# Patient Record
Sex: MALE | Race: BLACK OR AFRICAN AMERICAN | NOT HISPANIC OR LATINO | Employment: FULL TIME | URBAN - METROPOLITAN AREA
[De-identification: names, ages, dates, MRNs, and addresses within clinical notes are randomized per-mention and may not be internally consistent; named-entity substitution may affect disease eponyms.]

---

## 2017-04-26 ENCOUNTER — HOSPITAL ENCOUNTER (EMERGENCY)
Facility: HOSPITAL | Age: 52
Discharge: LEFT BEFORE DISCHARGE | End: 2017-04-26
Attending: EMERGENCY MEDICINE
Payer: MEDICARE

## 2017-04-26 VITALS
OXYGEN SATURATION: 97 % | HEART RATE: 91 BPM | TEMPERATURE: 98 F | SYSTOLIC BLOOD PRESSURE: 137 MMHG | BODY MASS INDEX: 33.09 KG/M2 | HEIGHT: 63 IN | RESPIRATION RATE: 24 BRPM | WEIGHT: 186.75 LBS | DIASTOLIC BLOOD PRESSURE: 93 MMHG

## 2017-04-26 DIAGNOSIS — R07.9 CHEST PAIN: ICD-10-CM

## 2017-04-26 DIAGNOSIS — I10 ESSENTIAL HYPERTENSION: Primary | ICD-10-CM

## 2017-04-26 LAB
ALBUMIN SERPL BCP-MCNC: 4.1 G/DL
ALP SERPL-CCNC: 58 U/L
ALT SERPL W/O P-5'-P-CCNC: 25 U/L
ANION GAP SERPL CALC-SCNC: 15 MMOL/L
AST SERPL-CCNC: 25 U/L
BASOPHILS # BLD AUTO: 0.04 K/UL
BASOPHILS NFR BLD: 1.1 %
BILIRUB SERPL-MCNC: 1.1 MG/DL
BNP SERPL-MCNC: 11 PG/ML
BUN SERPL-MCNC: 14 MG/DL
CALCIUM SERPL-MCNC: 9.8 MG/DL
CHLORIDE SERPL-SCNC: 104 MMOL/L
CO2 SERPL-SCNC: 21 MMOL/L
CREAT SERPL-MCNC: 0.9 MG/DL
D DIMER PPP IA.FEU-MCNC: 1.2 MG/L FEU
DIFFERENTIAL METHOD: ABNORMAL
EOSINOPHIL # BLD AUTO: 0.1 K/UL
EOSINOPHIL NFR BLD: 3.1 %
ERYTHROCYTE [DISTWIDTH] IN BLOOD BY AUTOMATED COUNT: 13.4 %
EST. GFR  (AFRICAN AMERICAN): >60 ML/MIN/1.73 M^2
EST. GFR  (NON AFRICAN AMERICAN): >60 ML/MIN/1.73 M^2
GLUCOSE SERPL-MCNC: 90 MG/DL
HCT VFR BLD AUTO: 44.8 %
HGB BLD-MCNC: 15.8 G/DL
INR PPP: 1
LYMPHOCYTES # BLD AUTO: 1.6 K/UL
LYMPHOCYTES NFR BLD: 45.2 %
MCH RBC QN AUTO: 30.8 PG
MCHC RBC AUTO-ENTMCNC: 35.3 %
MCV RBC AUTO: 87 FL
MONOCYTES # BLD AUTO: 0.3 K/UL
MONOCYTES NFR BLD: 8 %
NEUTROPHILS # BLD AUTO: 1.5 K/UL
NEUTROPHILS NFR BLD: 42.3 %
PLATELET # BLD AUTO: 241 K/UL
PMV BLD AUTO: 9 FL
POTASSIUM SERPL-SCNC: 4.7 MMOL/L
PROT SERPL-MCNC: 8.1 G/DL
PROTHROMBIN TIME: 10.6 SEC
RBC # BLD AUTO: 5.13 M/UL
RETIRED EF AND QEF - SEE NOTES: 55 (ref 55–65)
SODIUM SERPL-SCNC: 140 MMOL/L
TROPONIN I SERPL DL<=0.01 NG/ML-MCNC: <0.006 NG/ML
TROPONIN I SERPL DL<=0.01 NG/ML-MCNC: <0.006 NG/ML
WBC # BLD AUTO: 3.52 K/UL

## 2017-04-26 PROCEDURE — 93351 STRESS TTE COMPLETE: CPT

## 2017-04-26 PROCEDURE — 99285 EMERGENCY DEPT VISIT HI MDM: CPT

## 2017-04-26 PROCEDURE — 85025 COMPLETE CBC W/AUTO DIFF WBC: CPT

## 2017-04-26 PROCEDURE — 25500020 PHARM REV CODE 255: Performed by: EMERGENCY MEDICINE

## 2017-04-26 PROCEDURE — 93005 ELECTROCARDIOGRAM TRACING: CPT

## 2017-04-26 PROCEDURE — 80053 COMPREHEN METABOLIC PANEL: CPT

## 2017-04-26 PROCEDURE — 85610 PROTHROMBIN TIME: CPT

## 2017-04-26 PROCEDURE — 84484 ASSAY OF TROPONIN QUANT: CPT

## 2017-04-26 PROCEDURE — 83880 ASSAY OF NATRIURETIC PEPTIDE: CPT

## 2017-04-26 PROCEDURE — 85379 FIBRIN DEGRADATION QUANT: CPT

## 2017-04-26 PROCEDURE — 25000003 PHARM REV CODE 250: Performed by: EMERGENCY MEDICINE

## 2017-04-26 PROCEDURE — 93351 STRESS TTE COMPLETE: CPT | Mod: 26,,, | Performed by: INTERNAL MEDICINE

## 2017-04-26 PROCEDURE — 93010 ELECTROCARDIOGRAM REPORT: CPT | Mod: ,,, | Performed by: INTERNAL MEDICINE

## 2017-04-26 PROCEDURE — 93321 DOPPLER ECHO F-UP/LMTD STD: CPT | Mod: 26,,, | Performed by: INTERNAL MEDICINE

## 2017-04-26 RX ORDER — ASPIRIN 325 MG
325 TABLET ORAL DAILY
COMMUNITY
End: 2023-08-02

## 2017-04-26 RX ORDER — ACETAMINOPHEN 500 MG
1000 TABLET ORAL
Status: COMPLETED | OUTPATIENT
Start: 2017-04-26 | End: 2017-04-26

## 2017-04-26 RX ORDER — ASPIRIN 325 MG
325 TABLET ORAL
Status: COMPLETED | OUTPATIENT
Start: 2017-04-26 | End: 2017-04-26

## 2017-04-26 RX ADMIN — NITROGLYCERIN 1 INCH: 20 OINTMENT TOPICAL at 11:04

## 2017-04-26 RX ADMIN — ACETAMINOPHEN 1000 MG: 500 TABLET ORAL at 11:04

## 2017-04-26 RX ADMIN — ASPIRIN 325 MG ORAL TABLET 325 MG: 325 PILL ORAL at 10:04

## 2017-04-26 RX ADMIN — IOHEXOL 100 ML: 350 INJECTION, SOLUTION INTRAVENOUS at 01:04

## 2017-04-26 NOTE — ED NOTES
52 year old male presents to ed with chief complaint of left sided chest pain with radiation to bilateral back that began yesterday. Patient states sob with exertion. Patient awake alert ox4 answering nurses questions appropriately patient placed on heart monitor side rails up call light at bedside nurse will continue to monitor.

## 2017-04-26 NOTE — ED PROVIDER NOTES
"Encounter Date: 4/26/2017       History     Chief Complaint   Patient presents with    Chest Pain     intermittent sharp midsternal chest pains and SOB since yesterday. Reports SOB on exertion for the past few months. History of open heart surgery     Review of patient's allergies indicates:  No Known Allergies  HPI Comments: 51 Y/O AAM WITH PMHX OF TOBACCO ABUSE AND REMOTE PSHX OF CARDIAC SURGERY "TO PATCH A HOLE IN MY HEART" PRESENTS WITH C/O CP AND SOB THAT BEGAN YESTERDAY.  PT REPORTS ACUTE ONSET OF INTERMITTENT, NON-RADIATING, THROBBING, LEFT ANTERIOR CHEST PAIN THAT BEGAN YESTERDAY MORNING.  PAINFUL EPISODES 20 MINUTES DURATION.  PAIN RESOLVES W/O INTERVENTION.  PT BECAME MORE CONCERNED ABOUT HIS CP TODAY BECAUSE HIS SOB WAS WORSENING.  + DIAPHORESIS, + PALPITATIONS "FEELS LIKE MY HEART IS RACING."  PT REPORTS FEELING LIKE THERE IS A "KNOT IN MY BELLY" POINTING TO THE LUQ.  PT IS PAIN FREE AT THIS TIME.  PAIN IS WORSENED WITH EXERTION.  NO RELIEVING FACTORS NOTED.  PT REPORTS HE HAS NOT BEEN TO SEE A DOCTOR IN YEARS AND DOES NOT KNOW IF HE HAS HTN, DM, HLD OR CAD.      The history is provided by the patient. No  was used.     Past Medical History:   Diagnosis Date    Enlarged heart      Past Surgical History:   Procedure Laterality Date    BACK SURGERY      CARDIAC SURGERY      heart      SHOULDER SURGERY       No family history on file.  Social History   Substance Use Topics    Smoking status: Never Smoker    Smokeless tobacco: Never Used    Alcohol use No     Review of Systems   Constitutional: Negative for activity change, appetite change, chills, diaphoresis and fever.   HENT: Negative for congestion and sinus pressure.    Eyes: Negative for pain and redness.   Respiratory: Positive for shortness of breath. Negative for cough, chest tightness and wheezing.    Cardiovascular: Positive for chest pain and palpitations. Negative for leg swelling.   Gastrointestinal: Positive for " nausea. Negative for abdominal distention, abdominal pain, diarrhea, rectal pain and vomiting.   Endocrine: Negative for polydipsia and polyphagia.   Genitourinary: Negative for difficulty urinating and dysuria.   Musculoskeletal: Negative for back pain and neck pain.   Skin: Negative for pallor and rash.   Allergic/Immunologic: Negative for immunocompromised state.   Neurological: Negative for dizziness and headaches.   Hematological: Does not bruise/bleed easily.   Psychiatric/Behavioral: Negative for agitation and confusion.   All other systems reviewed and are negative.      Physical Exam   Initial Vitals   BP Pulse Resp Temp SpO2   04/26/17 1011 04/26/17 1011 04/26/17 1011 04/26/17 1011 04/26/17 1011   143/95 86 24 97.8 °F (36.6 °C) 96 %     Physical Exam    Nursing note and vitals reviewed.  Constitutional: He appears well-developed and well-nourished. He is not diaphoretic. No distress.   HENT:   Head: Normocephalic and atraumatic.   Mouth/Throat: Oropharynx is clear and moist.   Eyes: Conjunctivae and EOM are normal. No scleral icterus.   Neck: Normal range of motion. Neck supple.   Cardiovascular: Normal rate, regular rhythm and normal heart sounds. Exam reveals no gallop and no friction rub.    No murmur heard.  Pulmonary/Chest: Breath sounds normal. No respiratory distress. He has no wheezes. He has no rhonchi. He has no rales. He exhibits tenderness. He exhibits no mass, no crepitus and no edema.       Abdominal: Soft. Bowel sounds are normal. He exhibits no distension. There is no tenderness.   Musculoskeletal: Normal range of motion. He exhibits no edema or tenderness.   Lymphadenopathy:     He has no cervical adenopathy.   Neurological: He is alert and oriented to person, place, and time.   Skin: Skin is warm and dry. No rash noted. No erythema.   Psychiatric: He has a normal mood and affect. His behavior is normal. Judgment and thought content normal.         ED Course   Procedures  Labs Reviewed  "  CBC W/ AUTO DIFFERENTIAL   COMPREHENSIVE METABOLIC PANEL   PROTIME-INR   TROPONIN I   TROPONIN I   B-TYPE NATRIURETIC PEPTIDE   D DIMER, QUANTITATIVE     EKG Readings: (Independently Interpreted)   Initial Reading: No STEMI. Previous EKG: Compared with most recent EKG Previous EKG Date: 04/30/12. Heart Rate: 84.       X-Rays:   Independently Interpreted Readings:   Chest X-Ray: Normal heart size.  No infiltrates.  No acute abnormalities.     Medical Decision Making:   Initial Assessment:   53 Y/O WITH INTERMITTENT CP AND SOB X 2 DAYS  Differential Diagnosis:   DDX: PULMONARY EMBOLUS, PNEUMONIA, STEMI, ARRHYTHMIA, AAA  Independently Interpreted Test(s):   I have ordered and independently interpreted X-rays - see prior notes.  I have ordered and independently interpreted EKG Reading(s) - see prior notes  Clinical Tests:   Lab Tests: Ordered and Reviewed  The following lab test(s) were unremarkable: CBC, CMP, Troponin and BNP       <> Summary of Lab: ELEVATED D-DIMER  Radiological Study: Ordered and Reviewed  Medical Tests: Ordered and Reviewed  ED Management:  NITRO PASTE TO CHEST WALL.  D-DIMER IS ELEVATED SO CTA LUNG WAS DONE AND IS NEG FOR PE.  I HAVE CONSULTED DR. CAO AND HE WILL READ PT STRESS DONE IN ED.  PT IS CP FREE.    1437:  B/P 137/93, HR 88 BPM, SAT 98%    PT HAS HAD A NEG CTA IN ED AND TWO NEG TROP.  PT HAD A STRESS ECHO DONE TODAY BUT REFUSES TO WAIT FOR RESULTS OF TEST.  PT IS REQUESTING TO "SIGN SOMETHING AND LEAVE."  PT IS AWARE THAT HE MAY DIE OF SUDDEN CARDIAC DEATH OR SUFFER DISABILITY, HEART ATTACK OR STROKE BY LEAVING AMA.  I HAVE ASKED HIM TO WAIT FOR HIS RESULTS AND HE REFUSES.   PT ENCOURAGED TO RETURN IF SYMPTOMS WORSEN.   Other:   I have discussed this case with another health care provider.       <> Summary of the Discussion: DR. CAO                   ED Course     Clinical Impression:   The primary encounter diagnosis was Essential hypertension. A diagnosis of Chest pain was " also pertinent to this visit.    Disposition:   Disposition: AMA  Condition: Stable       Ladonna Mackey MD  04/26/17 7105

## 2017-04-26 NOTE — PROGRESS NOTES
Attempted to flush pt's IV with saline prior to contrast injection and noticed line had infiltrated.  Started new line in LAC (18g) and completed CT using new line.

## 2017-04-26 NOTE — ED NOTES
Patient requesting to leave ama. Patient stating he is ready to go see his family. Dr. Mackey at bedside

## 2017-04-27 ENCOUNTER — NURSE TRIAGE (OUTPATIENT)
Dept: ADMINISTRATIVE | Facility: CLINIC | Age: 52
End: 2017-04-27

## 2017-04-27 NOTE — TELEPHONE ENCOUNTER
Reason for Disposition   Requesting regular office appointment    Protocols used: ST INFORMATION ONLY CALL-A-AH    Karsten is calling to report he was seen in ER yesterday for chest pain.   had a work up but left AMA prior to results.   has no chest pain or any symptoms today but wants to schedule an appointment with a cardiologist who can go over results with him.  Transferred to scheduling for appointment

## 2017-05-11 ENCOUNTER — TELEPHONE (OUTPATIENT)
Dept: CARDIOLOGY | Facility: CLINIC | Age: 52
End: 2017-05-11

## 2017-05-11 NOTE — TELEPHONE ENCOUNTER
I attempted to reach patient for schedule change for tomorrow.His appointment had to be rescheduled due to Provider being needed at hospital tomorrow.I left message for patient we can place in appointment with another provider next week to contact the office at [phone] and ask for Natacha or Rosa Isela.

## 2017-05-11 NOTE — TELEPHONE ENCOUNTER
The patient called back and was explained why his appointment was rescheduled for next week.He stated he went to the ER for CP but because he needed to go home and be with his children he needed to sign AMA and did not receive an evaluation.I cautioned patient to go back to the ER if CP would become more frequent or not relieved.He verbalized understanding and will see Cardiology next week .

## 2017-05-18 ENCOUNTER — OFFICE VISIT (OUTPATIENT)
Dept: CARDIOLOGY | Facility: CLINIC | Age: 52
End: 2017-05-18
Payer: MEDICARE

## 2017-05-18 VITALS
DIASTOLIC BLOOD PRESSURE: 94 MMHG | SYSTOLIC BLOOD PRESSURE: 137 MMHG | HEART RATE: 85 BPM | BODY MASS INDEX: 32.99 KG/M2 | WEIGHT: 186.19 LBS | HEIGHT: 63 IN

## 2017-05-18 DIAGNOSIS — I10 ESSENTIAL HYPERTENSION: Chronic | ICD-10-CM

## 2017-05-18 DIAGNOSIS — R00.0 RACING HEART BEAT: ICD-10-CM

## 2017-05-18 DIAGNOSIS — I73.9 CLAUDICATION: ICD-10-CM

## 2017-05-18 DIAGNOSIS — R06.09 DOE (DYSPNEA ON EXERTION): Primary | ICD-10-CM

## 2017-05-18 DIAGNOSIS — R07.89 CHEST TIGHTNESS: ICD-10-CM

## 2017-05-18 DIAGNOSIS — R42 LIGHTHEADEDNESS: ICD-10-CM

## 2017-05-18 PROCEDURE — 99999 PR PBB SHADOW E&M-EST. PATIENT-LVL III: CPT | Mod: PBBFAC,,, | Performed by: NURSE PRACTITIONER

## 2017-05-18 PROCEDURE — 99203 OFFICE O/P NEW LOW 30 MIN: CPT | Mod: S$PBB,,, | Performed by: NURSE PRACTITIONER

## 2017-05-18 PROCEDURE — 99213 OFFICE O/P EST LOW 20 MIN: CPT | Mod: PBBFAC,PO | Performed by: NURSE PRACTITIONER

## 2017-05-18 RX ORDER — HYDROCHLOROTHIAZIDE 25 MG/1
25 TABLET ORAL DAILY
Qty: 30 TABLET | Refills: 6 | Status: SHIPPED | OUTPATIENT
Start: 2017-05-18 | End: 2023-08-02

## 2017-05-18 NOTE — MR AVS SNAPSHOT
[facility]  [address] [facility]  [place] [address]  Phone: [phone]  Fax: [phone]                  Karsten Zuniga   2017 8:20 AM   Office Visit    Description:  Male : 1965   Provider:  Mary Greer NP   Department:  [facility]           Reason for Visit     Hospital Follow Up     Consult           Diagnoses this Visit        Comments    CARLOS (dyspnea on exertion)    -  Primary     Chest tightness         Lightheadedness         Racing heart beat         Stenosis of carotid artery, unspecified laterality         Claudication         Acute cerebrovascular insufficiency                To Do List           Future Appointments        Provider Department Dept Phone    2017 1:30 PM HOLTER [facility] [phone]    2017 2:30 PM COMFORT ECHO SCHEDULE [facility] [phone]    2017 3:15 PM [facility]FORT ECHO SCHEDULE [facility] [phone]    2017 11:00 AM Mary Greer NP [facility] [phone]      Goals (5 Years of Data)     None       These Medications        Disp Refills Start End    hydrochlorothiazide (HYDRODIURIL) 25 MG tablet 30 tablet 6 2017     Take 1 tablet (25 mg total) by mouth once daily. - Oral    Pharmacy: [facility] [address] [place] [address] [address] AT [facility] [address] Ph #: [phone]         [facility]     [facility] On Call Nurse Care Line - 24/ Assistance  Unless otherwise directed by your provider, please contact [facility], our nurse care line that is available for 24/7 assistance.     Registered nurses in the [facility] Center provide: appointment scheduling, clinical advisement, health education, and other advisory services.  Call: [phone] (toll free)               Medications           Message regarding Medications     Verify the changes and/or additions to your medication regime listed below are the same as discussed with your clinician  "today.  If any of these changes or additions are incorrect, please notify your healthcare provider.        START taking these NEW medications        Refills    hydrochlorothiazide (HYDRODIURIL) 25 MG tablet 6    Sig: Take 1 tablet (25 mg total) by mouth once daily.    Class: Normal    Route: Oral           Verify that the below list of medications is an accurate representation of the medications you are currently taking.  If none reported, the list may be blank. If incorrect, please contact your healthcare provider. Carry this list with you in case of emergency.           Current Medications     aspirin 325 MG tablet Take 325 mg by mouth once daily.    hydrochlorothiazide (HYDRODIURIL) 25 MG tablet Take 1 tablet (25 mg total) by mouth once daily.           Clinical Reference Information           Your Vitals Were     BP Pulse Height Weight BMI    137/94 (BP Location: Left arm, Patient Position: Sitting, BP Method: Automatic) 85 5' 3" (1.6 m) 84.5 kg (186 lb 2.9 oz) 32.98 kg/m2      Blood Pressure          Most Recent Value    BP  (!)  137/94      Allergies as of 5/18/2017     No Known Allergies      Immunizations Administered on Date of Encounter - 5/18/2017     None      Orders Placed During Today's Visit      Normal Orders This Visit    Ambulatory referral to Pulmonology     Future Labs/Procedures Expected by Expires    CAR Ultrasound doppler arterial legs bilat  As directed 5/18/2018    CAR Ultrasound doppler carotid bliateral  As directed 5/18/2018    Holter Monitor - 3-14 Day Adult (zio patch)  As directed 5/18/2018      [facility] Sign-Up     Activating your [facility] account is as easy as 1-2-3!     1) Visit [email or web address], select Sign Up Now, enter this activation code and your date of birth, then select Next.  [email or web address]  Expires: 7/2/2017  9:03 AM      2) Create a username and password to use when you visit [facility] in the future and select a security question in case you lose your password and " select Next.    3) Enter your e-mail address and click Sign Up!    Additional Information  If you have questions, please e-mail [email or web address] or call [phone] to talk to our [facility] staff. Remember, [facility] is NOT to be used for urgent needs. For medical emergencies, dial 911.         Language Assistance Services     ATTENTION: Language assistance services are available, free of charge. Please call [phone].      ATENCIÓN: Si habla rubia, tiene a pope disposición servicios gratuitos de asistencia lingüística. Llame al [phone].     CHÚ Ý: N?u b?n nói Ti?ng Vi?t, có các d?ch v? h? tr? ngôn ng? mi?n phí dành cho b?n. G?i s? [phone].         [facility] complies with applicable Federal civil rights laws and does not discriminate on the basis of race, color, national origin, age, disability, or sex.

## 2017-05-18 NOTE — PROGRESS NOTES
Subjective:    Patient ID:  Karsten Zuniga is a 52 y.o. male who presents for evaluation of Hospital Follow Up and Consult      HPI: Mr. Karsten Zuniga presents to the clinic for evaluation of chest pain. he stated that he has left side of chest that radiates to the back. It occurs with exertion such as walking. It tends to start out with lightheadedness while walking and then chest discomfort begins. It is always associated with SOB. He stated that with rest, all symptoms resolve. He has had these symptoms for quite some time and no one can figure out what is causing them. He denied N/V/diaphoresis. He also says that it feels like his heart is not beating right. It may be racing, but he really can't describe it.  He stated that in the 90s, he was evaluated by a cardiologist in [place] and had heart surgery to close a hole in his heart. He had these same symptoms back then, and they did not improve after surgery. He denied any coronary stents or MI in the past.  Went to [facility] on 4/26/17 for these same symptoms. Troponin negative X 2 . He had an exercise stress echo. He stated that he had chest tightness, SOB and lightheadedness. He wanted to get off the treadmill but he tried to reach his target heart rate. He achieved 83% of his Max. Pred. Heart rate. His symptoms resolved in early recovery. He also had CT of chest-No PE. Chest x-ray negative. He left hospital AMA before he received the results. /95 pulse 86 in ER.    He stated that he does snore loudly, but he has never been told that he stops breathing. Never had a sleep study.    He stated that he is disabled, and doesn't do much at home. He takes care of his horses in the morning and doesn't do much after that.     EX2D 4/26/17: CONCLUSIONS     1 - Normal left ventricular systolic function (EF 55-60%).     2 - No wall motion abnormalities.   No evidence of stress induced myocardial ischemia. Sensitivity reduce as stress images were obtained at  sub optimal HR-83% Max pred. Heart rate. He had moderate chest pain during protocol that resolved spontaneously during early recovery.    Medications: he is taking only ASA. He stated that he used to be on heart medications about 15 years ago, but stopped them when he started using drugs. He does not recall what the medications were or why he was taking them.  Sodium: he does not add salt to foods at the table; his wife does cook with a little salt,  he is not reading labels for sodium content.   Exercise: he does not; walks around, but it causes lightheadedness and later chest tightness occurs with SOB  Tobacco:Former smoker; quit 8 years ago; smoked 1.5 ppd for 30 years. drinks 2-3 beers (cans) at a time for 4-5 days per week.     Weight: 84.5 kg (186 lb 2.9 oz) he states that his weight has increased over the last year.  Wt Readings from Last 3 Encounters:   05/18/17 84.5 kg (186 lb 2.9 oz)   04/26/17 84.7 kg (186 lb 11.7 oz)   05/13/16 72.6 kg (160 lb)     BP log: None; doesn't have a monitoring device.      Review of Systems   Constitution: Negative for chills, decreased appetite, diaphoresis, fever, night sweats, weight gain and weight loss.   HENT: Negative for congestion.    Cardiovascular: Positive for chest pain, claudication (He complains of pain with walking that improves when he stops. He uses a self-propelled , and he stated that he holds on to the bar and sometimes, he has to drag his feet becasue his legs hurt.), dyspnea on exertion and leg swelling. Negative for cyanosis, irregular heartbeat, near-syncope, orthopnea, palpitations, paroxysmal nocturnal dyspnea and syncope.   Respiratory: Positive for snoring. Negative for cough, hemoptysis, shortness of breath, sputum production and wheezing.    Hematologic/Lymphatic: Negative for adenopathy and bleeding problem. Does not bruise/bleed easily.   Skin: Negative for color change and nail changes.   Gastrointestinal: Negative for bloating,  abdominal pain, change in bowel habit, heartburn, hematochezia, melena, nausea and vomiting.   Genitourinary: Negative for hematuria.   Neurological: Positive for light-headedness. Negative for dizziness.   Psychiatric/Behavioral: Negative for altered mental status.       Objective:   Physical Exam   Constitutional: He is oriented to person, place, and time. He appears well-developed and well-nourished. No distress.   HENT:   Head: Normocephalic and atraumatic.   Eyes: Conjunctivae are normal. No scleral icterus.   Neck: Neck supple. No JVD present. No tracheal deviation present. No thyromegaly present.   Cardiovascular: Normal rate, regular rhythm, normal heart sounds and intact distal pulses.  Exam reveals no gallop and no friction rub.    No murmur heard.  Pulmonary/Chest: Effort normal and breath sounds normal. No respiratory distress. He has no wheezes. He has no rales. He exhibits no tenderness.   Abdominal: Soft. Bowel sounds are normal. He exhibits no distension and no mass. There is no tenderness. There is no rebound and no guarding.   Musculoskeletal: Normal range of motion. He exhibits no edema.   Lymphadenopathy:     He has no cervical adenopathy.   Neurological: He is alert and oriented to person, place, and time.   Skin: Skin is warm and dry. No rash noted. He is not diaphoretic. No erythema. No pallor.   Pink   Psychiatric: He has a normal mood and affect.   Results for ANSHU CRESPO (MRN [id number]) as of 5/18/2017 08:19   Ref. Range 4/26/2017 10:28 4/26/2017 13:54   Sodium Latest Ref Range: 136 - 145 mmol/L 140    Potassium Latest Ref Range: 3.5 - 5.1 mmol/L 4.7    Chloride Latest Ref Range: 95 - 110 mmol/L 104    CO2 Latest Ref Range: 23 - 29 mmol/L 21 (L)    Anion Gap Latest Ref Range: 8 - 16 mmol/L 15    BUN, Bld Latest Ref Range: 6 - 20 mg/dL 14    Creatinine Latest Ref Range: 0.5 - 1.4 mg/dL 0.9    eGFR if non African American Latest Ref Range: >60 mL/min/1.73 m^2 >60    eGFR if African American  Latest Ref Range: >60 mL/min/1.73 m^2 >60    Glucose Latest Ref Range: 70 - 110 mg/dL 90    Calcium Latest Ref Range: 8.7 - 10.5 mg/dL 9.8    Alkaline Phosphatase Latest Ref Range: 55 - 135 U/L 58    Total Protein Latest Ref Range: 6.0 - 8.4 g/dL 8.1    Albumin Latest Ref Range: 3.5 - 5.2 g/dL 4.1    Total Bilirubin Latest Ref Range: 0.1 - 1.0 mg/dL 1.1 (H)    AST Latest Ref Range: 10 - 40 U/L 25    ALT Latest Ref Range: 10 - 44 U/L 25    Troponin I Latest Ref Range: 0.000 - 0.026 ng/mL <0.006 <0.006   BNP Latest Ref Range: 0 - 99 pg/mL 11    Results for ANSHU CRESPO (MRN [id number]) as of 5/18/2017 08:19   Ref. Range 4/26/2017 10:28   WBC Latest Ref Range: 3.90 - 12.70 K/uL 3.52 (L)   RBC Latest Ref Range: 4.60 - 6.20 M/uL 5.13   Hemoglobin Latest Ref Range: 14.0 - 18.0 g/dL 15.8   Hematocrit Latest Ref Range: 40.0 - 54.0 % 44.8   MCV Latest Ref Range: 82 - 98 fL 87   MCH Latest Ref Range: 27.0 - 31.0 pg 30.8   MCHC Latest Ref Range: 32.0 - 36.0 % 35.3   RDW Latest Ref Range: 11.5 - 14.5 % 13.4   Platelets Latest Ref Range: 150 - 350 K/uL 241   MPV Latest Ref Range: 9.2 - 12.9 fL 9.0 (L)   Gran% Latest Ref Range: 38.0 - 73.0 % 42.3   Gran # Latest Ref Range: 1.8 - 7.7 K/uL 1.5 (L)   Lymph% Latest Ref Range: 18.0 - 48.0 % 45.2   Lymph # Latest Ref Range: 1.0 - 4.8 K/uL 1.6   Mono% Latest Ref Range: 4.0 - 15.0 % 8.0   Mono # Latest Ref Range: 0.3 - 1.0 K/uL 0.3   Eosinophil% Latest Ref Range: 0.0 - 8.0 % 3.1   Eos # Latest Ref Range: 0.0 - 0.5 K/uL 0.1   Basophil% Latest Ref Range: 0.0 - 1.9 % 1.1   Baso # Latest Ref Range: 0.00 - 0.20 K/uL 0.04   Protime Latest Ref Range: 9.0 - 12.5 sec 10.6   Coumadin Monitoring INR Latest Ref Range: 0.8 - 1.2  1.0   D-Dimer Latest Ref Range: <0.50 mg/L FEU 1.20 (H)        Assessment:      1. CARLOS (dyspnea on exertion)    2. Chest tightness    3. Lightheadedness    4. Racing heart beat    5. Claudication    6. Essential hypertension        Plan:   Reviewed EKG from 4/26/17: NSR;  widespread T wave inversion-likely LVH with repolarization abnormality.    Carotid ultrasound  Arterial ultrasound of the legs with ALEM.  Zio patch monitor.  Start HCTZ.  Sodium restriction; handout given to share with his wife.  Referral to pulmonary for evaluation of CARLOS and possible KUMAR. He prefers to stay in [place] and go to [facility].  Get cardiology records from [facility]-possible angiogram and he had cardiac surgery there.  Follow up after testing complete.

## 2017-05-24 ENCOUNTER — TELEPHONE (OUTPATIENT)
Dept: CARDIOLOGY | Facility: CLINIC | Age: 52
End: 2017-05-24

## 2017-05-24 NOTE — TELEPHONE ENCOUNTER
Patient informed that zio patch will be applied on 6/1 after 1:00pm when the zio rep comes in to be present for appt. Verbalized understanding

## 2017-05-25 ENCOUNTER — CLINICAL SUPPORT (OUTPATIENT)
Dept: CARDIOLOGY | Facility: CLINIC | Age: 52
End: 2017-05-25
Payer: MEDICARE

## 2017-05-25 DIAGNOSIS — R42 LIGHTHEADEDNESS: ICD-10-CM

## 2017-05-25 DIAGNOSIS — I73.9 CLAUDICATION: ICD-10-CM

## 2017-05-25 DIAGNOSIS — R06.09 DOE (DYSPNEA ON EXERTION): ICD-10-CM

## 2017-05-25 DIAGNOSIS — R07.89 CHEST TIGHTNESS: ICD-10-CM

## 2017-05-25 LAB — INTERNAL CAROTID STENOSIS: NORMAL

## 2017-05-25 PROCEDURE — 93925 LOWER EXTREMITY STUDY: CPT | Mod: 26,S$PBB,, | Performed by: INTERNAL MEDICINE

## 2017-05-25 PROCEDURE — 93880 EXTRACRANIAL BILAT STUDY: CPT | Mod: PBBFAC,PO | Performed by: INTERNAL MEDICINE

## 2017-06-02 ENCOUNTER — LAB VISIT (OUTPATIENT)
Dept: LAB | Facility: HOSPITAL | Age: 52
End: 2017-06-02
Attending: NURSE PRACTITIONER
Payer: MEDICARE

## 2017-06-02 ENCOUNTER — OFFICE VISIT (OUTPATIENT)
Dept: CARDIOLOGY | Facility: CLINIC | Age: 52
End: 2017-06-02
Payer: MEDICARE

## 2017-06-02 VITALS
SYSTOLIC BLOOD PRESSURE: 132 MMHG | HEIGHT: 63 IN | HEART RATE: 81 BPM | WEIGHT: 185.5 LBS | DIASTOLIC BLOOD PRESSURE: 86 MMHG | BODY MASS INDEX: 32.87 KG/M2

## 2017-06-02 DIAGNOSIS — R42 DIZZINESS: ICD-10-CM

## 2017-06-02 DIAGNOSIS — R53.83 FATIGUE, UNSPECIFIED TYPE: ICD-10-CM

## 2017-06-02 DIAGNOSIS — I10 HTN, GOAL BELOW 140/90: ICD-10-CM

## 2017-06-02 DIAGNOSIS — R06.83 SNORING: Chronic | ICD-10-CM

## 2017-06-02 DIAGNOSIS — R06.09 DOE (DYSPNEA ON EXERTION): Chronic | ICD-10-CM

## 2017-06-02 DIAGNOSIS — Z71.2 ENCOUNTER TO DISCUSS TEST RESULTS: Primary | ICD-10-CM

## 2017-06-02 LAB
CHOLEST/HDLC SERPL: 3.9 {RATIO}
HDL/CHOLESTEROL RATIO: 25.4 %
HDLC SERPL-MCNC: 236 MG/DL
HDLC SERPL-MCNC: 60 MG/DL
LDLC SERPL CALC-MCNC: 164.6 MG/DL
NONHDLC SERPL-MCNC: 176 MG/DL
TRIGL SERPL-MCNC: 57 MG/DL
TSH SERPL DL<=0.005 MIU/L-ACNC: 1.28 UIU/ML

## 2017-06-02 PROCEDURE — 99213 OFFICE O/P EST LOW 20 MIN: CPT | Mod: S$PBB,,, | Performed by: NURSE PRACTITIONER

## 2017-06-02 PROCEDURE — 84443 ASSAY THYROID STIM HORMONE: CPT

## 2017-06-02 PROCEDURE — 36415 COLL VENOUS BLD VENIPUNCTURE: CPT | Mod: PO

## 2017-06-02 PROCEDURE — 80061 LIPID PANEL: CPT

## 2017-06-02 PROCEDURE — 99999 PR PBB SHADOW E&M-EST. PATIENT-LVL IV: CPT | Mod: PBBFAC,,, | Performed by: NURSE PRACTITIONER

## 2017-06-02 NOTE — PROGRESS NOTES
Subjective:    Patient ID:  Karsten Zuniga is a 52 y.o. male who presents for follow-up of Dizziness (recently had echo)      HPI: . Karsten Zuniga presents to the clinic for follow up of test results. he stated that he had an episode of dizziness this morning, with some SOB and a knot in his left chest anteriorly at the diphram area. He stated that it lasted about one hour. He said that these episodes are recurrent and very scary. The dizziness feels like he is drunk-the room is spinning. And it also feels like his heart is not beating right. He denied dehydration or missing meals. He stated that he was not able to come for appointment for Zio patch. It has been rescheduled.  He related that his wife stated that he snores loudly; he is sleepy during the day.    Medications: he is not missing any doses of HCTZ, and he is not having any problems with it.  Sodium: he does add salt to foods only when cooking, but they reduced the amount.  he is not reading labels for sodium content.   Exercise: he does not; sedentary  Tobacco: Former smoker. Beer, one can 2-3 times a month.    Arterial U/S of legs 5/25/17: normal chayo bilateral   the waveforms are normal at all levels with normal triphasic waveforms w/o any significant stenosis.    Carotid U/S 5/25/17: CONCLUSIONS   There is 20 - 39% right Internal Carotid stenosis.  There is 20 - 39% left Internal Carotid stenosis.    EX2D 4/2017: CONCLUSIONS     1 - Normal left ventricular systolic function (EF 55-60%).     2 - No wall motion abnormalities.   No evidence of stress induced myocardial ischemia. Sensitivity reduce as stress images were obtained at sub optimal HR (83%).     Weight: 84.1 kg (185 lb 8 oz) he states that his daily weights has been stable  Wt Readings from Last 3 Encounters:   06/02/17 84.1 kg (185 lb 8 oz)   05/18/17 84.5 kg (186 lb 2.9 oz)   04/26/17 84.7 kg (186 lb 11.7 oz)     BP log: None.    Review of Systems   Constitution: Positive for malaise/fatigue.  Negative for chills, decreased appetite, fever, night sweats, weight gain and weight loss.   HENT: Negative for congestion, ear discharge, ear pain, headaches, hoarse voice and tinnitus.    Cardiovascular: Positive for dyspnea on exertion (walking short distances). Negative for claudication, cyanosis, irregular heartbeat, leg swelling, near-syncope, orthopnea, paroxysmal nocturnal dyspnea and syncope.   Respiratory: Positive for shortness of breath. Negative for cough, hemoptysis, sputum production and wheezing.    Hematologic/Lymphatic: Negative for adenopathy and bleeding problem. Does not bruise/bleed easily.   Skin: Negative for color change and nail changes.   Gastrointestinal: Negative for bloating, abdominal pain, change in bowel habit, heartburn, hematochezia, melena, nausea and vomiting.   Genitourinary: Negative for hematuria.   Neurological: Positive for dizziness. Negative for light-headedness.   Psychiatric/Behavioral: Negative for altered mental status.       Objective:   Physical Exam   Constitutional: He is oriented to person, place, and time. He appears well-developed and well-nourished. No distress.   HENT:   Head: Normocephalic and atraumatic.   Eyes: Conjunctivae are normal. No scleral icterus.   Neck: Neck supple. No JVD present. No tracheal deviation present. No thyromegaly present.   Cardiovascular: Normal rate, regular rhythm, normal heart sounds and intact distal pulses.  Exam reveals no gallop and no friction rub.    No murmur heard.  Pulmonary/Chest: Effort normal and breath sounds normal. No respiratory distress. He has no wheezes. He has no rales. He exhibits no tenderness.   Abdominal: Soft. Bowel sounds are normal. He exhibits no distension and no mass. There is no tenderness. There is no rebound and no guarding.   Musculoskeletal: Normal range of motion. He exhibits no edema.   Lymphadenopathy:     He has no cervical adenopathy.   Neurological: He is alert and oriented to person,  place, and time.   Skin: Skin is warm and dry. No rash noted. He is not diaphoretic. No erythema. No pallor.   Pink   Psychiatric: He has a normal mood and affect.        Assessment:      1. Encounter to discuss test results    2. HTN, goal below 140/90    3. Dizziness    4. Fatigue, unspecified type    5. CARLOS (dyspnea on exertion)    6. Snoring        Plan:     Labs today: BMP, TSH, FLP.  Zio patch for palpitations.  Ambulatory referral to ENT.  Ambulatory referral to pulmonology-CARLOS and possible KUMAR; he prefers to stay in [facility].  Have not received records from [facility]-will try again-had cardiac surgery  Follow up after testing complete.

## 2017-06-05 ENCOUNTER — TELEPHONE (OUTPATIENT)
Dept: CARDIOLOGY | Facility: CLINIC | Age: 52
End: 2017-06-05

## 2017-06-05 NOTE — TELEPHONE ENCOUNTER
I called [facility] to schedule patient for ENT consult as ordered but they are not taking new medicaid patients at this time.  I will forward to provider to advise.

## 2017-06-08 ENCOUNTER — CLINICAL SUPPORT (OUTPATIENT)
Dept: CARDIOLOGY | Facility: CLINIC | Age: 52
End: 2017-06-08
Payer: MEDICARE

## 2017-06-08 DIAGNOSIS — R00.0 RACING HEART BEAT: ICD-10-CM

## 2017-06-08 DIAGNOSIS — R07.89 CHEST TIGHTNESS: ICD-10-CM

## 2017-06-08 DIAGNOSIS — R06.09 DOE (DYSPNEA ON EXERTION): ICD-10-CM

## 2017-06-08 DIAGNOSIS — R42 LIGHTHEADEDNESS: ICD-10-CM

## 2017-06-08 PROCEDURE — 0296T HOLTER MONITOR - 3-14 DAY ADULT: CPT | Mod: PBBFAC,PO | Performed by: INTERNAL MEDICINE

## 2017-06-08 PROCEDURE — 0298T HOLTER MONITOR - 3-14 DAY ADULT: CPT | Mod: ,,, | Performed by: INTERNAL MEDICINE

## 2017-06-08 NOTE — PROGRESS NOTES
Patient here for Zio patch .Instructions given/ reviewed with patient and he verbalized understanding.F/U appointment made.

## 2017-06-14 NOTE — TELEPHONE ENCOUNTER
Left a voice mail for patient to return my call to discuss ENT at [facility] due to medicaid and no providers in this area taking new medicaid, waiting for a call back from patient.

## 2017-06-30 ENCOUNTER — OFFICE VISIT (OUTPATIENT)
Dept: CARDIOLOGY | Facility: CLINIC | Age: 52
End: 2017-06-30
Payer: MEDICARE

## 2017-06-30 VITALS
WEIGHT: 183.44 LBS | HEIGHT: 63 IN | SYSTOLIC BLOOD PRESSURE: 138 MMHG | DIASTOLIC BLOOD PRESSURE: 78 MMHG | BODY MASS INDEX: 32.5 KG/M2 | HEART RATE: 81 BPM

## 2017-06-30 DIAGNOSIS — R06.83 SNORING: Chronic | ICD-10-CM

## 2017-06-30 DIAGNOSIS — I10 HTN, GOAL BELOW 140/90: ICD-10-CM

## 2017-06-30 DIAGNOSIS — R06.09 DOE (DYSPNEA ON EXERTION): ICD-10-CM

## 2017-06-30 DIAGNOSIS — E78.00 PURE HYPERCHOLESTEROLEMIA: ICD-10-CM

## 2017-06-30 DIAGNOSIS — Z71.2 ENCOUNTER TO DISCUSS TEST RESULTS: Primary | ICD-10-CM

## 2017-06-30 DIAGNOSIS — R07.89 ATYPICAL CHEST PAIN: ICD-10-CM

## 2017-06-30 PROCEDURE — 99999 PR PBB SHADOW E&M-EST. PATIENT-LVL III: CPT | Mod: PBBFAC,,, | Performed by: NURSE PRACTITIONER

## 2017-06-30 PROCEDURE — 99214 OFFICE O/P EST MOD 30 MIN: CPT | Mod: S$PBB,,, | Performed by: NURSE PRACTITIONER

## 2017-06-30 PROCEDURE — 99213 OFFICE O/P EST LOW 20 MIN: CPT | Mod: PBBFAC,PO | Performed by: NURSE PRACTITIONER

## 2017-06-30 RX ORDER — ATORVASTATIN CALCIUM 20 MG/1
20 TABLET, FILM COATED ORAL DAILY
Qty: 30 TABLET | Refills: 3 | Status: SHIPPED | OUTPATIENT
Start: 2017-06-30 | End: 2023-08-02

## 2017-06-30 NOTE — PROGRESS NOTES
"Subjective:    Patient ID:  Karsten Zuniga is a 52 y.o. male who presents for follow-up of Follow-up      HPI: Mr. Karsten Zuniga presents to the clinic for follow up of test results and dizziness with palpitations. he stated that he mailed in his Zio patch about three days ago; it has not yet arrived. He stated that he has SOB whenever he exerts himself. We left message for him about pulmonary consult, but he did not return our call.    Walks about 100 feet and has to stop and rest due to SOB. It takes 20-30 minutes to recover from this and he can walk again. It is accompanied by dizziness and sharp pain down the right arm. He also complains of pain in the chest that he describes as "sticking pain". He stated that these are the same symptoms that he had when he had heart surgery last time. They improved after surgery. We have requested records twice from [facility], but we have not received anything. He stated that he had a patch placed for a hole in his heart. He believes that the symptoms he is having now are the same as then ([id number]).    He also stated that he will lose his disability money come July 1 if we can't come up with a diagnosis for his heart condition. Stress test is normal. There is no PA pressure on echo portion of the study.    Medications: he is not missing any doses of HCTZ, and he is not having any problems with it.  Sodium: he does add salt to foods only when cooking, but they reduced the amount.  he is not reading labels for sodium content.   Diet: he has not made any changes to his diet after receiving cholesterol results. He stated that he is doing the best he can.  Exercise: he does not; sedentary  Tobacco: Former smoker. Beer, one can 2-3 times a month.    Carotid U/S 5/25/17: CONCLUSIONS   There is 20 - 39% right Internal Carotid stenosis.  There is 20 - 39% left Internal Carotid stenosis    EX2D 4/2017: CONCLUSIONS     1 - Normal left ventricular systolic function (EF " 55-60%).     2 - No wall motion abnormalities.   No evidence of stress induced myocardial ischemia. Sensitivity reduce as stress images were obtained at sub-optimal HR (83%).     Weight: 83.2 kg (183 lb 6.8 oz) he states that his daily weights has been stable  Wt Readings from Last 3 Encounters:   06/30/17 83.2 kg (183 lb 6.8 oz)   06/02/17 84.1 kg (185 lb 8 oz)   05/18/17 84.5 kg (186 lb 2.9 oz)     BP log: None.    Review of Systems   Constitution: Negative for chills, decreased appetite, fever, night sweats, weight gain and weight loss.   HENT: Negative for congestion.    Cardiovascular: Positive for chest pain, dyspnea on exertion and palpitations. Negative for claudication, cyanosis, irregular heartbeat, leg swelling, near-syncope, orthopnea, paroxysmal nocturnal dyspnea and syncope.   Respiratory: Negative for cough, hemoptysis, shortness of breath, sputum production and wheezing.    Hematologic/Lymphatic: Negative for adenopathy and bleeding problem. Does not bruise/bleed easily.   Skin: Negative for color change and nail changes.   Gastrointestinal: Negative for bloating, abdominal pain, change in bowel habit, heartburn, hematochezia, melena, nausea and vomiting.   Genitourinary: Negative for hematuria.   Neurological: Positive for dizziness. Negative for light-headedness.   Psychiatric/Behavioral: Negative for altered mental status.       Objective:   Physical Exam   Constitutional: He is oriented to person, place, and time. He appears well-developed and well-nourished. No distress.   HENT:   Head: Normocephalic and atraumatic.   Eyes: Conjunctivae are normal. No scleral icterus.   Neck: Neck supple. No JVD present. No tracheal deviation present. No thyromegaly present.   Cardiovascular: Normal rate, regular rhythm, normal heart sounds and intact distal pulses.  Exam reveals no gallop and no friction rub.    No murmur heard.  Pulmonary/Chest: Effort normal and breath sounds normal. No respiratory distress.  He has no wheezes. He has no rales. He exhibits no tenderness.   Abdominal: Soft. Bowel sounds are normal. He exhibits no distension and no mass. There is no tenderness. There is no rebound and no guarding.   Musculoskeletal: Normal range of motion. He exhibits no edema.   Lymphadenopathy:     He has no cervical adenopathy.   Neurological: He is alert and oriented to person, place, and time.   Skin: Skin is warm and dry. No rash noted. He is not diaphoretic. No erythema. No pallor.   Pink   Psychiatric: He has a normal mood and affect.   Results for ANSHU CRESPO (MRN [id number]) as of 6/30/2017 08:18   Ref. Range 6/2/2017 12:04   Triglycerides Latest Ref Range: 30 - 150 mg/dL 57   Cholesterol Latest Ref Range: 120 - 199 mg/dL 236 (H)   HDL Latest Ref Range: 40 - 75 mg/dL 60   LDL Cholesterol Latest Ref Range: 63.0 - 159.0 mg/dL 164.6 (H)   Total Cholesterol/HDL Ratio Latest Ref Range: 2.0 - 5.0  3.9   TSH Latest Ref Range: 0.400 - 4.000 uIU/mL 1.277   Results for ANSHU CRESPO (MRN [id number]) as of 6/30/2017 08:18   Ref. Range 6/2/2017 12:04   Non-HDL Cholesterol Latest Units: mg/dL 176        Assessment:      1. Encounter to discuss test results    2. CARLOS (dyspnea on exertion)    3. Atypical chest pain    4. Pure hypercholesterolemia    5. HTN, goal below 140/90    6. Snoring        Plan:   Left and right heart cath to be done in [place] to [place] as possible.  Awaiting results of Holter study (Zio patch).  Pulmonary referral for CARLOS (former smoker) and snoring-evaluation for KUMAR. [place] not accepting Medicaid. [facility] does not have pulmonary services. Will continue to work on this referral.  Atorvastatin 20mg QD.  Request records from [facility]. (Cardiac surgery and heart cath in [id number]).  Continue HCTZ as directed.  Encouraged him to expect calls from our office regarding pulmonology and angiogram, as well as results of holter study.  Follow up after angiogram.  45 minutes  spent with  Efrain in counseling and sorting through the status of each of the parts of his plan of care.

## 2017-07-05 ENCOUNTER — TELEPHONE (OUTPATIENT)
Dept: CARDIOLOGY | Facility: CLINIC | Age: 52
End: 2017-07-05

## 2017-07-05 NOTE — TELEPHONE ENCOUNTER
Spoke with patient about his upcoming appointments with cvsurgery and pulmonary. He verbalized understanding. He is wait listed for earlier appointment with pulm.  Will notify provider. Also, I am still attempting to get records form [facility]. They say the records have been requested but since they are very old they have not yet been processed and completed at this time. I expressed the urgency in obtaining them as I have asked for them 3 times now.  They verbalized that they would be sent.

## 2017-07-06 ENCOUNTER — TELEPHONE (OUTPATIENT)
Dept: VASCULAR SURGERY | Facility: CLINIC | Age: 52
End: 2017-07-06

## 2017-07-06 NOTE — TELEPHONE ENCOUNTER
----- Message from Sylvia Cannon sent at 7/6/2017  9:26 AM CDT -----  Patient states he missed a call from office please call [phone] (home)

## 2017-07-06 NOTE — TELEPHONE ENCOUNTER
Patient was scheduled to see Dr Valentine for heart cath. I spoke to Candance in [facility] NP office to let her know that Dr Valentine does not do heart caths. She will   arrange heart cath prior to consultation with Dr Valentine

## 2017-07-07 ENCOUNTER — TELEPHONE (OUTPATIENT)
Dept: CARDIOLOGY | Facility: CLINIC | Age: 52
End: 2017-07-07

## 2017-07-07 DIAGNOSIS — Z01.818 PRE-OPERATIVE CLEARANCE: Primary | ICD-10-CM

## 2017-07-07 DIAGNOSIS — R06.02 SOB (SHORTNESS OF BREATH) ON EXERTION: ICD-10-CM

## 2017-07-07 DIAGNOSIS — Z01.818 PRE-OP TESTING: ICD-10-CM

## 2017-07-07 DIAGNOSIS — I10 ESSENTIAL HYPERTENSION: ICD-10-CM

## 2017-07-07 DIAGNOSIS — R06.09 DYSPNEA ON EXERTION: ICD-10-CM

## 2017-07-07 NOTE — TELEPHONE ENCOUNTER
----- Message from Jenifer Lake sent at 7/7/2017 10:50 AM CDT -----  Contact: [phone]  Patient returning call. Please call back at [phone].    Thanks,  Jenifer Lake

## 2017-07-07 NOTE — TELEPHONE ENCOUNTER
I left message on patient voice mail per patient wishes if he did not answer. I left preop cath testing date of Monday 7/10/2017 for EKG/labs/chest xray.  I also informed him that he would be called concerning cath procedure date and time in the message. Faxing cathlab reservation form to [place] at this time.

## 2017-07-07 NOTE — TELEPHONE ENCOUNTER
Attempted unsuccessfully to reach patient at this time to discuss medical records situation.  Left voice message to return call.

## 2017-07-07 NOTE — TELEPHONE ENCOUNTER
I received communication from [facility] in [facility] that no records exist on the patient.  This came via fax. Will notify provider.

## 2017-07-10 ENCOUNTER — TELEPHONE (OUTPATIENT)
Dept: CARDIOLOGY | Facility: CLINIC | Age: 52
End: 2017-07-10

## 2017-07-10 ENCOUNTER — CLINICAL SUPPORT (OUTPATIENT)
Dept: CARDIOLOGY | Facility: CLINIC | Age: 52
End: 2017-07-10
Payer: MEDICARE

## 2017-07-10 ENCOUNTER — HOSPITAL ENCOUNTER (OUTPATIENT)
Dept: RADIOLOGY | Facility: HOSPITAL | Age: 52
Discharge: HOME | End: 2017-07-10
Attending: NURSE PRACTITIONER
Payer: MEDICARE

## 2017-07-10 DIAGNOSIS — Z01.818 PRE-OP TESTING: ICD-10-CM

## 2017-07-10 PROCEDURE — 71020 XR CHEST PA AND LATERAL: CPT | Mod: TC,PO

## 2017-07-10 PROCEDURE — 71020 XR CHEST PA AND LATERAL: CPT | Mod: 26,,, | Performed by: RADIOLOGY

## 2017-07-11 PROCEDURE — 93010 ELECTROCARDIOGRAM REPORT: CPT | Mod: S$PBB,,, | Performed by: NUCLEAR MEDICINE

## 2017-07-11 PROCEDURE — 93005 ELECTROCARDIOGRAM TRACING: CPT | Mod: PBBFAC,PO | Performed by: NUCLEAR MEDICINE

## 2017-07-12 NOTE — TELEPHONE ENCOUNTER
Patient returned phone call to Cardiology to schedule/confirm heart cath with Dr. Delong.  Reviewed NPO except for medications, continue Aspirin daily, arrival time 7AM for 7/28/17 9AM start time.

## 2017-07-28 PROBLEM — I20.89 ANGINAL EQUIVALENT: Status: ACTIVE | Noted: 2017-07-28

## 2017-08-17 ENCOUNTER — TELEPHONE (OUTPATIENT)
Dept: PULMONOLOGY | Facility: CLINIC | Age: 52
End: 2017-08-17

## 2017-08-17 DIAGNOSIS — R06.09 DOE (DYSPNEA ON EXERTION): Primary | ICD-10-CM

## 2017-08-23 ENCOUNTER — DOCUMENTATION ONLY (OUTPATIENT)
Dept: PULMONOLOGY | Facility: CLINIC | Age: 52
End: 2017-08-23

## 2017-08-23 ENCOUNTER — TELEPHONE (OUTPATIENT)
Dept: PULMONOLOGY | Facility: CLINIC | Age: 52
End: 2017-08-23

## 2023-03-31 ENCOUNTER — OCCUPATIONAL HEALTH (OUTPATIENT)
Dept: URGENT CARE | Facility: CLINIC | Age: 58
End: 2023-03-31
Payer: MEDICARE

## 2023-03-31 DIAGNOSIS — Z13.9 ENCOUNTER FOR SCREENING: Primary | ICD-10-CM

## 2023-03-31 PROCEDURE — 86580 POCT TB SKIN TEST: ICD-10-PCS | Mod: S$GLB,,,

## 2023-03-31 PROCEDURE — 99002 DEVICE HANDLING PHYS/QHP: CPT | Mod: S$GLB,,,

## 2023-03-31 PROCEDURE — 99002 MASK FIT-QUALITATIVE: ICD-10-PCS | Mod: S$GLB,,,

## 2023-03-31 PROCEDURE — 99080 OSHA QUESTIONNAIRE: ICD-10-PCS | Mod: S$GLB,,,

## 2023-03-31 PROCEDURE — 86580 TB INTRADERMAL TEST: CPT | Mod: S$GLB,,,

## 2023-03-31 PROCEDURE — 99080 SPECIAL REPORTS OR FORMS: CPT | Mod: S$GLB,,,

## 2023-04-05 ENCOUNTER — OCCUPATIONAL HEALTH (OUTPATIENT)
Dept: URGENT CARE | Facility: CLINIC | Age: 58
End: 2023-04-05

## 2023-04-05 DIAGNOSIS — Z13.9 ENCOUNTER FOR SCREENING: Primary | ICD-10-CM

## 2023-04-05 PROCEDURE — 86580 TB INTRADERMAL TEST: CPT | Mod: S$GLB,,, | Performed by: PHYSICIAN ASSISTANT

## 2023-04-05 PROCEDURE — 86580 POCT TB SKIN TEST: ICD-10-PCS | Mod: S$GLB,,, | Performed by: PHYSICIAN ASSISTANT

## 2023-04-07 LAB
TB INDURATION - 48 HR READ: NORMAL
TB INDURATION - 72 HR READ: NORMAL
TB SKIN TEST - 48 HR READ: NORMAL
TB SKIN TEST - 72 HR READ: NORMAL

## 2023-07-27 ENCOUNTER — TELEPHONE (OUTPATIENT)
Dept: VASCULAR SURGERY | Facility: CLINIC | Age: 58
End: 2023-07-27
Payer: MEDICARE

## 2023-07-27 NOTE — TELEPHONE ENCOUNTER
----- Message from Sweta Abernathy sent at 7/27/2023 12:04 PM CDT -----  Regarding: appointment  Contact: patient  Type:  Sooner Appointment Request    Caller is requesting a sooner appointment.  Caller declined first available appointment listed below.  Caller will not accept being placed on the waitlist and is requesting a message be sent to doctor.    Name of Caller:  patient  When is the first available appointment?    Symptoms:  valve replaced  Best Call Back Number:  [phone] (home)     Additional Information:  Please call patient to schedule.  Thanks!

## 2023-08-02 ENCOUNTER — OFFICE VISIT (OUTPATIENT)
Dept: VASCULAR SURGERY | Facility: CLINIC | Age: 58
End: 2023-08-02
Payer: MEDICARE

## 2023-08-02 VITALS
DIASTOLIC BLOOD PRESSURE: 77 MMHG | HEART RATE: 82 BPM | BODY MASS INDEX: 25.69 KG/M2 | HEIGHT: 63 IN | SYSTOLIC BLOOD PRESSURE: 140 MMHG

## 2023-08-02 DIAGNOSIS — I35.1 AORTIC VALVE INSUFFICIENCY, ETIOLOGY OF CARDIAC VALVE DISEASE UNSPECIFIED: Primary | ICD-10-CM

## 2023-08-02 PROCEDURE — 99204 PR OFFICE/OUTPT VISIT, NEW, LEVL IV, 45-59 MIN: ICD-10-PCS | Mod: S$GLB,,, | Performed by: THORACIC SURGERY (CARDIOTHORACIC VASCULAR SURGERY)

## 2023-08-02 PROCEDURE — 3078F DIAST BP <80 MM HG: CPT | Mod: CPTII,S$GLB,, | Performed by: THORACIC SURGERY (CARDIOTHORACIC VASCULAR SURGERY)

## 2023-08-02 PROCEDURE — 1159F PR MEDICATION LIST DOCUMENTED IN MEDICAL RECORD: ICD-10-PCS | Mod: CPTII,S$GLB,, | Performed by: THORACIC SURGERY (CARDIOTHORACIC VASCULAR SURGERY)

## 2023-08-02 PROCEDURE — 3008F PR BODY MASS INDEX (BMI) DOCUMENTED: ICD-10-PCS | Mod: CPTII,S$GLB,, | Performed by: THORACIC SURGERY (CARDIOTHORACIC VASCULAR SURGERY)

## 2023-08-02 PROCEDURE — 3078F PR MOST RECENT DIASTOLIC BLOOD PRESSURE < 80 MM HG: ICD-10-PCS | Mod: CPTII,S$GLB,, | Performed by: THORACIC SURGERY (CARDIOTHORACIC VASCULAR SURGERY)

## 2023-08-02 PROCEDURE — 99204 OFFICE O/P NEW MOD 45 MIN: CPT | Mod: S$GLB,,, | Performed by: THORACIC SURGERY (CARDIOTHORACIC VASCULAR SURGERY)

## 2023-08-02 PROCEDURE — 3008F BODY MASS INDEX DOCD: CPT | Mod: CPTII,S$GLB,, | Performed by: THORACIC SURGERY (CARDIOTHORACIC VASCULAR SURGERY)

## 2023-08-02 PROCEDURE — 99999 PR PBB SHADOW E&M-EST. PATIENT-LVL II: CPT | Mod: PBBFAC,,, | Performed by: THORACIC SURGERY (CARDIOTHORACIC VASCULAR SURGERY)

## 2023-08-02 PROCEDURE — 99999 PR PBB SHADOW E&M-EST. PATIENT-LVL II: ICD-10-PCS | Mod: PBBFAC,,, | Performed by: THORACIC SURGERY (CARDIOTHORACIC VASCULAR SURGERY)

## 2023-08-02 PROCEDURE — 3077F PR MOST RECENT SYSTOLIC BLOOD PRESSURE >= 140 MM HG: ICD-10-PCS | Mod: CPTII,S$GLB,, | Performed by: THORACIC SURGERY (CARDIOTHORACIC VASCULAR SURGERY)

## 2023-08-02 PROCEDURE — 1159F MED LIST DOCD IN RCRD: CPT | Mod: CPTII,S$GLB,, | Performed by: THORACIC SURGERY (CARDIOTHORACIC VASCULAR SURGERY)

## 2023-08-02 PROCEDURE — 3077F SYST BP >= 140 MM HG: CPT | Mod: CPTII,S$GLB,, | Performed by: THORACIC SURGERY (CARDIOTHORACIC VASCULAR SURGERY)

## 2023-08-02 RX ORDER — LOSARTAN POTASSIUM 50 MG/1
TABLET ORAL
COMMUNITY

## 2023-08-02 NOTE — PROGRESS NOTES
This patient has symptomatic aortic insufficiency.  This patient has noticed dyspnea on exertion.  He can only walk short distances before he becomes easily short of breath.  He has also been evaluated for hip replacement and is unable to have this surgery unless the aortic insufficiency which is symptomatic is addressed.  He had heart catheterization and coronary angiography showing significant aortic insufficiency.  Coronary arteries appeared be normal.  The patient has had previous heart surgery consisting of repair of a septal defect of some kind according to the patient.  This was done decades ago and according to the patient it was done with a patch.  It is unclear whether it was atrial or ventricular.  He is chronic hypertension.  He has hyperlipidemia as well.    He is had no other major surgeries.    He is not a smoker.    On exam vital signs are stable.  Pupils are equal and round reactive to light.  Neck is supple.  Chest has equal breath sounds.  Heart is in a regular rate and rhythm.  Abdomen is benign.  Perfusion to the legs and feet seems to be satisfactory.    The patient on heart catheterization has significant aortic insufficiency.  Echocardiography shows preserved left ventricular function.    Recommendation is for aortic valve replacement.  The risks and potential complications were discussed.  The patient seems to be understanding and agreeable.

## 2023-08-04 ENCOUNTER — TELEPHONE (OUTPATIENT)
Dept: VASCULAR SURGERY | Facility: CLINIC | Age: 58
End: 2023-08-04
Payer: MEDICARE

## 2023-08-04 DIAGNOSIS — I35.1 AORTIC VALVE INSUFFICIENCY, ETIOLOGY OF CARDIAC VALVE DISEASE UNSPECIFIED: Primary | ICD-10-CM

## 2023-08-04 DIAGNOSIS — I35.1 AORTIC VALVE INSUFFICIENCY: ICD-10-CM

## 2023-08-04 RX ORDER — HYDROCODONE BITARTRATE AND ACETAMINOPHEN 500; 5 MG/1; MG/1
TABLET ORAL
Status: CANCELLED | OUTPATIENT
Start: 2023-08-04

## 2023-08-04 RX ORDER — MUPIROCIN 20 MG/G
OINTMENT TOPICAL
Status: CANCELLED | OUTPATIENT
Start: 2023-08-04

## 2023-08-04 RX ORDER — CHLORHEXIDINE GLUCONATE ORAL RINSE 1.2 MG/ML
10 SOLUTION DENTAL
Status: CANCELLED | OUTPATIENT
Start: 2023-08-04

## 2023-08-04 NOTE — TELEPHONE ENCOUNTER
----- Message from Saniya Levy sent at 8/4/2023 10:11 AM CDT -----  Regarding: rt call  Type:  Patient Returning Call    Who Called:pt    Who Left Message for Patient:unknown    Does the patient know what this is regarding?:no    Best Call Back Number:[phone]      Additional Information: pt st that he had a missed call from office. Please call to discuss.

## 2023-08-04 NOTE — TELEPHONE ENCOUNTER
Patient notified   Pre-op 8/21 9:00AM STOPP  Surgery 8/23 7:00AM @ [facility]  States he is not on any blood thinner  Instructions mailed to:  PO   Saba Thomas [id number]

## 2023-08-18 DIAGNOSIS — R06.09 DOE (DYSPNEA ON EXERTION): Primary | Chronic | ICD-10-CM

## 2023-08-22 ENCOUNTER — TELEPHONE (OUTPATIENT)
Dept: VASCULAR SURGERY | Facility: CLINIC | Age: 58
End: 2023-08-22
Payer: MEDICARE

## 2023-08-22 NOTE — TELEPHONE ENCOUNTER
Call placed to Mr Zuniga to advise that sx is cancelled for tomorrow, 8/23/23, as his insurance is requiring an appeal, since the original request was denied. No answer and no option to leave voicemail, as his voicemail is full. Tried contacting his wife on the number listed , but the number is not in service. My chart is pending, so no option to send chart message. Will try again tomorrow.

## 2023-08-22 NOTE — TELEPHONE ENCOUNTER
----- Message from Saba Swain sent at 8/22/2023  2:14 PM CDT -----  Contact: pt  Type: Needs Medical Advice  Who Called:  pt  Best Call Back Number: [phone]  Additional Information: pt was to have surgery tomorrow however insurance denied and he wants to know the status of what's next.please call to discuss

## 2023-08-29 ENCOUNTER — TELEPHONE (OUTPATIENT)
Dept: VASCULAR SURGERY | Facility: CLINIC | Age: 58
End: 2023-08-29
Payer: MEDICARE

## 2023-08-29 NOTE — TELEPHONE ENCOUNTER
Spoke with Mr Efrain who called and stated He wants to wait until the first of next year to have his sx, as He is about to be evicted from his appt due to no income for the past 2 months and he was just offered a new job. He wants to know how Dr Porter feels about him waiting. Advised that I will speak with him tomorrow when he returns to clinic and then return his call with Dr Porter's advice.

## 2023-08-29 NOTE — TELEPHONE ENCOUNTER
----- Message from Celina Fajardo sent at 8/29/2023  2:42 PM CDT -----  Regarding: Returning call  Type:  Patient Returning Call    Who Called:  Karsten  Who Left Message for Patient:  Jayden  Does the patient know what this is regarding?:  Yes  Best Call Back Number:  [phone]    Additional Information:    '

## 2023-09-01 NOTE — TELEPHONE ENCOUNTER
Spoke with Mr Zuniga and advised that I had discussed his situation with Dr Porter. Advised to keep in touch with us and if He starts having CP, increased SOB, etc, he needs to go to the ER. Mr Zuniga verbalized understanding and stated he will come to [facility], if He needs urgent care. I advised that if He is symtomatic, he needs to go to the nearest ER. Understanding verbalized.

## 2023-09-15 ENCOUNTER — TELEPHONE (OUTPATIENT)
Dept: VASCULAR SURGERY | Facility: CLINIC | Age: 58
End: 2023-09-15
Payer: MEDICARE

## 2023-09-15 NOTE — TELEPHONE ENCOUNTER
----- Message from Katty Marshall, Patient Care Assistant sent at 9/15/2023  3:55 PM CDT -----  Contact: [place] Laura Greene  [email or web address] is checking the status medical records and disability form, it was faxed on 9/11. Please call back to advise [phone]  Fax [phone] #[phone]  thanks

## 2023-09-18 NOTE — TELEPHONE ENCOUNTER
Dr Porter's note faxed, but patients short term disability PW not filled out as he cancelled his surgery and has not rescheduled at this time.